# Patient Record
Sex: FEMALE | Race: WHITE | Employment: FULL TIME
[De-identification: names, ages, dates, MRNs, and addresses within clinical notes are randomized per-mention and may not be internally consistent; named-entity substitution may affect disease eponyms.]

---

## 2017-03-15 ENCOUNTER — RECORDS - HEALTHEAST (OUTPATIENT)
Dept: ADMINISTRATIVE | Facility: OTHER | Age: 37
End: 2017-03-15

## 2017-08-02 ENCOUNTER — RECORDS - HEALTHEAST (OUTPATIENT)
Dept: ADMINISTRATIVE | Facility: OTHER | Age: 37
End: 2017-08-02

## 2018-04-20 ENCOUNTER — RECORDS - HEALTHEAST (OUTPATIENT)
Dept: ADMINISTRATIVE | Facility: OTHER | Age: 38
End: 2018-04-20

## 2019-03-10 ENCOUNTER — COMMUNICATION - HEALTHEAST (OUTPATIENT)
Dept: SCHEDULING | Facility: CLINIC | Age: 39
End: 2019-03-10

## 2019-03-28 ENCOUNTER — COMMUNICATION - HEALTHEAST (OUTPATIENT)
Dept: TELEHEALTH | Facility: CLINIC | Age: 39
End: 2019-03-28

## 2019-09-16 ENCOUNTER — COMMUNICATION - HEALTHEAST (OUTPATIENT)
Dept: TELEHEALTH | Facility: CLINIC | Age: 39
End: 2019-09-16

## 2020-02-03 ENCOUNTER — OFFICE VISIT - HEALTHEAST (OUTPATIENT)
Dept: FAMILY MEDICINE | Facility: CLINIC | Age: 40
End: 2020-02-03

## 2020-02-03 ENCOUNTER — COMMUNICATION - HEALTHEAST (OUTPATIENT)
Dept: FAMILY MEDICINE | Facility: CLINIC | Age: 40
End: 2020-02-03

## 2020-02-03 DIAGNOSIS — R87.810 CERVICAL HIGH RISK HPV (HUMAN PAPILLOMAVIRUS) TEST POSITIVE: ICD-10-CM

## 2020-02-03 DIAGNOSIS — Z13.228 SCREENING FOR METABOLIC DISORDER: ICD-10-CM

## 2020-02-03 DIAGNOSIS — Z00.00 ROUTINE GENERAL MEDICAL EXAMINATION AT A HEALTH CARE FACILITY: ICD-10-CM

## 2020-02-03 DIAGNOSIS — F41.9 ANXIETY: ICD-10-CM

## 2020-02-03 LAB
CHOLEST SERPL-MCNC: 201 MG/DL
FASTING STATUS PATIENT QL REPORTED: YES
HBA1C MFR BLD: 5 % (ref 3.5–6)
HDLC SERPL-MCNC: 61 MG/DL
LDLC SERPL CALC-MCNC: 125 MG/DL
TRIGL SERPL-MCNC: 77 MG/DL
TSH SERPL DL<=0.005 MIU/L-ACNC: 1.02 UIU/ML (ref 0.3–5)

## 2020-02-03 ASSESSMENT — ANXIETY QUESTIONNAIRES
GAD7 TOTAL SCORE: 0
6. BECOMING EASILY ANNOYED OR IRRITABLE: NOT AT ALL
7. FEELING AFRAID AS IF SOMETHING AWFUL MIGHT HAPPEN: NOT AT ALL
IF YOU CHECKED OFF ANY PROBLEMS ON THIS QUESTIONNAIRE, HOW DIFFICULT HAVE THESE PROBLEMS MADE IT FOR YOU TO DO YOUR WORK, TAKE CARE OF THINGS AT HOME, OR GET ALONG WITH OTHER PEOPLE: NOT DIFFICULT AT ALL
3. WORRYING TOO MUCH ABOUT DIFFERENT THINGS: NOT AT ALL
1. FEELING NERVOUS, ANXIOUS, OR ON EDGE: NOT AT ALL
2. NOT BEING ABLE TO STOP OR CONTROL WORRYING: NOT AT ALL
4. TROUBLE RELAXING: NOT AT ALL
5. BEING SO RESTLESS THAT IT IS HARD TO SIT STILL: NOT AT ALL

## 2020-02-03 ASSESSMENT — PATIENT HEALTH QUESTIONNAIRE - PHQ9: SUM OF ALL RESPONSES TO PHQ QUESTIONS 1-9: 0

## 2020-02-03 ASSESSMENT — MIFFLIN-ST. JEOR: SCORE: 1241.48

## 2020-02-04 LAB
HPV SOURCE: NORMAL
HUMAN PAPILLOMA VIRUS 16 DNA: NEGATIVE
HUMAN PAPILLOMA VIRUS 18 DNA: NEGATIVE
HUMAN PAPILLOMA VIRUS FINAL DIAGNOSIS: NORMAL
HUMAN PAPILLOMA VIRUS OTHER HR: NEGATIVE
SPECIMEN DESCRIPTION: NORMAL

## 2020-02-11 LAB
BKR LAB AP ABNORMAL BLEEDING: NO
BKR LAB AP BIRTH CONTROL/HORMONES: NORMAL
BKR LAB AP CERVICAL APPEARANCE: NORMAL
BKR LAB AP GYN ADEQUACY: NORMAL
BKR LAB AP GYN INTERPRETATION: NORMAL
BKR LAB AP HPV REFLEX: NORMAL
BKR LAB AP LMP: NORMAL
BKR LAB AP PATIENT STATUS: NORMAL
BKR LAB AP PREVIOUS ABNORMAL: YES
BKR LAB AP PREVIOUS NORMAL: YES
HIGH RISK?: YES
PATH REPORT.COMMENTS IMP SPEC: NORMAL
RESULT FLAG (HE HISTORICAL CONVERSION): NORMAL

## 2020-09-03 ENCOUNTER — COMMUNICATION - HEALTHEAST (OUTPATIENT)
Dept: SCHEDULING | Facility: CLINIC | Age: 40
End: 2020-09-03

## 2020-09-03 DIAGNOSIS — Z20.828 EXPOSURE TO SARS-ASSOCIATED CORONAVIRUS: ICD-10-CM

## 2021-01-22 ENCOUNTER — AMBULATORY - HEALTHEAST (OUTPATIENT)
Dept: LAB | Facility: CLINIC | Age: 41
End: 2021-01-22

## 2021-01-22 DIAGNOSIS — E65 FAT DEPOSITS: ICD-10-CM

## 2021-01-22 LAB — HGB BLD-MCNC: 14.4 G/DL (ref 12–16)

## 2021-02-09 ENCOUNTER — COMMUNICATION - HEALTHEAST (OUTPATIENT)
Dept: FAMILY MEDICINE | Facility: CLINIC | Age: 41
End: 2021-02-09

## 2021-02-09 DIAGNOSIS — L70.0 ACNE VULGARIS: ICD-10-CM

## 2021-02-09 RX ORDER — TRETINOIN 0.5 MG/G
CREAM TOPICAL DAILY
Qty: 20 G | Refills: 4 | Status: SHIPPED | OUTPATIENT
Start: 2021-02-09

## 2021-02-12 ENCOUNTER — COMMUNICATION - HEALTHEAST (OUTPATIENT)
Dept: ADMINISTRATIVE | Facility: CLINIC | Age: 41
End: 2021-02-12

## 2021-05-27 ASSESSMENT — PATIENT HEALTH QUESTIONNAIRE - PHQ9: SUM OF ALL RESPONSES TO PHQ QUESTIONS 1-9: 0

## 2021-05-28 ASSESSMENT — ANXIETY QUESTIONNAIRES: GAD7 TOTAL SCORE: 0

## 2021-06-04 VITALS
HEIGHT: 61 IN | SYSTOLIC BLOOD PRESSURE: 108 MMHG | BODY MASS INDEX: 27.02 KG/M2 | DIASTOLIC BLOOD PRESSURE: 80 MMHG | HEART RATE: 80 BPM | WEIGHT: 143.1 LBS

## 2021-06-05 NOTE — TELEPHONE ENCOUNTER
Forms Request  Name of form/paperwork: Other:  Biometric Screening  Have you been seen for this request: Yes:  02/03/2020  Do we have the form: Yes- 02/03/2020  When is form needed by: As Able  How would you like the form returned:   Patient Notified form requests are processed in 3-5 business days: No    Okay to leave a detailed message? No

## 2021-06-05 NOTE — TELEPHONE ENCOUNTER
Biometric Screening has been printed. I will fill the screening out when patient's labs become available. Nikia Bedoya

## 2021-06-05 NOTE — PROGRESS NOTES
FEMALE PREVENTATIVE EXAM    Assessment and Plan:       Kierra was seen today for establish care and annual exam.    Routine general medical examination at a health care facility  I discussed the following with the patient:   Adult Healthy Living: Importance of regular exercise  Healthy nutrition  Getting adequate sleep  Stress management  Supplement use  Herbal medications/alternative medical therapies      Cervical high risk HPV (human papillomavirus) test positive  history of abnormal with colp in the past but last 2 within normal limits   -     Gynecologic Cytology (PAP Smear)    Screening for metabolic disorder  -     Glycosylated Hemoglobin A1c  -     Lipid Cascade    Mild Anxiety  -but no major concern with do base line thyroid hormone level       Thyroid Stimulating Hormone (TSH)    Other orders  -     Influenza,Seasonal,Quad,INJ =/>6months        Next follow up:  1 yr for annual physical    Immunization Reviewed and if needed ordered please see A/P    There are no preventive care reminders to display for this patient.    Immunization History   Administered Date(s) Administered     Hep A / Hep B 03/15/2017, 03/23/2017     Influenza,seasonal,quad inj =/> 6months 02/03/2020     Tdap 11/13/2012, 05/07/2014, 06/30/2016     Typhoid Live, Oral 03/15/2017     The following high BMI interventions were performed this visit: dietary management education, guidance, and counseling    I have had an Advance Directives discussion with the patient.    Subjective:   Chief Complaint: Kierra Hwang is an 40 y.o. female here for a preventative health visit.     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   work as a  .  Patient's last menstrual period was 01/31/2020 (exact date).     PHQ-9 Total Score: 0  "(2/3/2020 11:00 AM)     MADDI 7 Total Score: 0 (2/3/2020 12:00 PM)       Social History     Social History Narrative     Not on file      Healthy Habits  Are you taking a daily aspirin? No  Do you typically exercising at least 40 min, 3-4 times per week?  NO  Do you usually eat at least 4 servings of fruit and vegetables a day, include whole grains and fiber and avoid regularly eating high fat foods? Yes  Have you had an eye exam in the past two years? Yes  Do you see a dentist twice per year? Yes  Do you have any concerns regarding sleep? No    Safety Screen  If you own firearms, are they secured in a locked gun cabinet or with trigger locks? The patient does not own any firearms    Do you feel you are safe where you are living?: Yes (2/3/2020 10:57 AM)  Do you feel you are safe in your relationship(s)?: Yes (2/3/2020 10:57 AM)      Review of Systems:  Please see above.  The rest of the review of systems are negative for all systems.     Pap History:   Yes - updated in Problem List and Health Maintenance accordingly    Cancer Screening       Status Date      PAP SMEAR Overdue 1/6/1999           Patient Care Team:  Nette Friedman MD as PCP - General (Family Medicine)        History     Reviewed By Date/Time Sections Reviewed    Charlotte Johnson CMA 2/3/2020 12:01 PM Family    Nette Friedman MD 2/3/2020 11:41 AM Medical, Surgical    Charlotte Johnson CMA 2/3/2020 10:56 AM Tobacco, Alcohol, Drug Use, Sexual Activity            Objective:   Vital Signs:   Visit Vitals  /80 (Patient Site: Left Arm, Patient Position: Sitting, Cuff Size: Adult Regular)   Pulse 80   Ht 5' 1\" (1.549 m)   Wt 143 lb 1.6 oz (64.9 kg)   LMP 01/31/2020 (Exact Date)   Breastfeeding No   BMI 27.04 kg/m         PHYSICAL EXAM  Physical Exam:  General Appearance:  Appears comfortable, Alert, cooperative, no distress,   Head: Normocephalic, without obvious abnormality, atraumatic  Eyes: PERRL, conjunctiva/corneas clear, EOM's intact, both eyes  "            Nose: Nares normal, no drainage   Throat: Lips, mucosa, and tongue normal; teeth and gums normal  Neck: Supple, symmetrical, trachea midline, no adenopathy;                      Lungs: Clear to auscultation bilaterally, respirations unlabored  Heart: Regular rate and rhythm, S1 and S2 normal, no murmur, rubs or gallop  Abdomen: Soft, non-tender, bowel sounds active all four quadrants,   no masses, no organomegaly  Pelvic exam: normal external genitalia, vulva, vagina, cervix, uterus and adnexa, PAP: Pap smear done today.  Extremities: Extremities normal, atraumatic, no cyanosis or edema  Pulses: DP pulses are 1-2+ bilat.    Skin: no rashes or lesions  Neurologic: normal and equal strength bilat in upper and lower extremities          The ASCVD Risk score (Ceiba DAGOBERTO Jr., et al., 2013) failed to calculate for the following reasons:    Cannot find a previous HDL lab    Cannot find a previous total cholesterol lab         Medication List      as of February 3, 2020 12:41 PM     You have not been prescribed any medications.         Additional Screenings Completed Today:

## 2021-06-06 NOTE — PROGRESS NOTES
Dear Kierra,    Your recent Pap smear result came back within normal limits including negative for presence of any high risk viruses which are responsible for cervical cancer , we will plan to repeat the pap smear in next 5 yr  Please feel free to call if you have any concerns or questions..    Nette Friedman MD 2/11/2020 2:27 PM

## 2021-06-06 NOTE — TELEPHONE ENCOUNTER
Reached out to patient and per her request her Biometric Screening was faxed to her employer at: [phone], and a copy was mailed to her address on file.   Nikia Bedoya

## 2021-06-11 NOTE — TELEPHONE ENCOUNTER
"Asking for antibody test.  First asked her if she had symptoms in last 2 weeks and she said yes.  Then changed her mind to say a month ago.  Test ordered.    Patient is calling requesting COVID serologic antibody testing.  NOTE: Serologic testing is a blood test for 'antibodies' which are made at 10-14 days after you have had symptoms of COVID or were exposed and had an asymptomatic infection.  This does NOT test you for 'active' infection or tell you if you are contagious.    Are you a healthcare worker?  No  Do you currently have a cough, fever, body aches, shortness of breath, or difficulty breathing?  No  Did you previously have cough, fever, body aches, shortness of breath, or difficulty breathing that have now resolved? Has had previous covid symptoms.   Symptoms began 60 days ago.  Symptoms started > 14 days ago. Lab order placed per SARS-CoV-2 Serology test Standing Order using indication \"Previously symptomatic >14d since onset, currently asymptomatic\" and diagnosis code \"Screening for viral disease\" (Z11.59)          The patient was informed: \"Testing is limited each day and it may take time for testing to be available to everyone who has called. You will receive a call within 48-72 hours to schedule the serology testing. Please confirm the best number to reach you is [phone]. If you have any questions about scheduling, call [phone].\"     "

## 2021-06-15 NOTE — TELEPHONE ENCOUNTER
PCP ok to fill this?     Patient recommended to schedule a fasting physical. Overall, clean chart.

## 2021-06-15 NOTE — TELEPHONE ENCOUNTER
Incoming fax for Tretinoin 0.05% cream for prior authorization.     Key: [id number]  Last name: Jaiden  : 1980    Please initiate PA process.    Elizabeth Perry

## 2021-06-15 NOTE — TELEPHONE ENCOUNTER
Central PA team  [phone]  Pool: HE PA MED ([id number])          PA has been initiated.       PA form completed and faxed insurance via Cover My Meds     Key:  [id number] - PA Case ID: [id number] - Rx #: [id number]     Medication:  Tretinoin 0.05% cream    Insurance:  [employer]        Response will be received via fax and may take up to 5-10 business days depending on plan

## 2021-06-17 NOTE — TELEPHONE ENCOUNTER
Telephone Encounter by Nathalie Juarez at 2/16/2021 11:11 AM     Author: Nathalie Juarez Service: -- Author Type: --    Filed: 2/16/2021 11:11 AM Encounter Date: 2/12/2021 Status: Signed    : Nathalie Juarez APPROVED:    Approval start date: 0216/21  Approval end date:  02/16/24    Pharmacy has been notified of approval and will contact patient when medication is ready for pickup.

## 2021-06-18 NOTE — PATIENT INSTRUCTIONS - HE
Patient Instructions by Nette Friedman MD at 2/3/2020 11:20 AM     Author: Nette Friedman MD Service: -- Author Type: Physician    Filed: 2/3/2020 12:45 PM Encounter Date: 2/3/2020 Status: Signed    : Nette Friedman MD (Physician)         Patient Education     Prevention Guidelines, Women Ages 40 to 49  Screening tests and vaccines are an important part of managing your health. A screening test is done to find diseases in people who don't have any symptoms. The goal is to find a disease early so lifestyle changes and checkups can reduce the risk of disease. Or the goal may be to detect it early to treat it most effectively. Screening tests are not used to diagnose a disease. But they are used to see if more testing is needed. Health counseling is important, too. Below are guidelines for these, for women ages 40 to 49. Talk with your healthcare provider to make sure youre up-to-date on what you need.  Screening Who needs it How often   Type 2 diabetes or prediabetes All women beginning at age 45 and women without symptoms at any age who are overweight or obese and have 1 or more additional risk factors for diabetes At least every 3 years1   Type 2 diabetes or prediabetes All women diagnosed with gestational diabetes Lifelong testing every 3 years   Type 2 diabetes All women with prediabetes Every year   Alcohol misuse All women in this age group At routine exams   Blood pressure All women in this age group Yearly checkup if your blood pressure is normal  Normal blood pressure is less than 120/80 mm Hg  If your blood pressure reading is higher than normal, follow the advice of your healthcare provider   Breast cancer All women at average risk in this age group Screening with a mammogram can start at age 40.2 Talk with your healthcare provider to help you decide when to start screening. At age 45 start yearly mammograms.3    Cervical cancer All women in this age group, except women who have had a complete  Pre injection questionnaire was reviewed.  1. Have you had increased asthma symptoms (chest tightness, increased cough, wheezing or felt short of breath) in the past week? No  2. Have you had a marked increase in allergy symptoms(itchy eyes or nose, sneezing, runny nose, post nasal drip or throat clearing) in the past week?  No  3. Do you have a cold or respiratory tract infection, or flu-like symptoms? No  4. Did you have any problems such as increased allergy or asthma symptoms, hives or generalized itching within 12 hours of receiving your allergy injection or swelling that persisted into the next day? No  5.Are you on any new medications? Any new eye drops? Any new blood pressure or migraine medications? No  If yes,please specify____________________________________   6. Are you taking your allergy medicine as prescribed? Yes     hysterectomy Pap test every 3 years or Pap test plus human papilloma virus (HPV) test every 5 years   Colorectal cancer Women age 45 years and older at average risk  Multiple tests are available and are used at different times. Possible tests include:    Flexible sigmoidoscopy every 5 years, or    Colonoscopy every 10 years, or    CT colonography (virtual colonoscopy) every 5 years, or    Yearly fecal occult blood test, or    Yearly fecal immunochemical test every year, or    Stool DNA test, every 3 years  If you choose a test other than a colonoscopy and have an abnormal test result, you will need to follow-up with a colonoscopy. Screening advice varies among expert groups. Talk with your healthcare provider about which tests are best for you.  Some people should be screened using a different schedule because of their personal or family health history. Talk with your healthcare provider about your health history.   Chlamydia Women at increased risk for infection At routine exams if you're at risk or have symptoms   Depression All women in this age group At routine exams   Gonorrhea Sexually active women at increased risk for infection At routine exams   Hepatitis C Anyone at increased risk; 1 time for those born between 1945 and 1965 At routine exams   High cholesterol or triglycerides All women ages 45 and older who are at risk for coronary artery disease; younger women, talk with your healthcare provider At least every 5 years   HIV All women At routine exams. Those with risk factors for HIV should be tested at least annually.   Obesity All women in this age group At routine exams   Syphilis Women at increased risk for infection-talk with your healthcare provider At routine exams   Tuberculosis Women at increased risk for infection-talk with your healthcare provider Ask your healthcare provider   Vision All women in this age group Complete exam at age 40 and eye exams every 2 to 4 years. If you have a chronic  disease, ask your healthcare provider how often you should have your eyes examined.4   Vaccine Who needs it How often   Chickenpox (varicella) All women in this age group who have no record of this infection or vaccine 2 doses; the second dose should be given at least 4 weeks after the first dose   Hepatitis A Women at increased risk for infection-talk with your healthcare provider 2 doses given 6 months apart   Hepatitis B Women at increased risk for infection-talk with your healthcare provider 3 doses over 6 months; second dose should be given 1 month after the first dose; the third dose should be given at least 2 months after the second dose and at least 4 months after the first dose   Haemophilus influenzae Type B (HIB) Women at increased risk 1 to 3 doses   Influenza (flu) All women in this age group Once a year   Measles, mumps, rubella (MMR) All women in this age group who have no record of these infections or vaccines 1 or 2 doses   Meningococcal Women at increased risk for infection-talk with your healthcare provider 1 or more doses   Pneumococcal conjugate vaccine (PCV13) and pneumococcal polysaccharide vaccine (PPSV23) Women at increased risk for infection-talk with your healthcare provider 1 or 2 doses   Tetanus/diphtheria/pertussis (Td/Tdap) booster All women in this age group A 1-time dose of Tdap instead of a Td booster after age 18, then Td every 10 years   Counseling Who needs it How often   BRCA gene mutation testing for breast and ovarian cancer susceptibility Women with increased risk for having gene mutation When your risk is known   Breast cancer and chemoprevention Women at high risk for breast cancer When your risk is known   Diet and exercise Women who are overweight or obese When diagnosed, and then at routine exams   Domestic violence Women at the age in which they are able to have children At routine exams   Sexually transmitted infection prevention Women at increased risk for  infection-talk with your healthcare provider At routine exams   Use of tobacco and the health effects it can cause All women in this age group Every exam   1 American Diabetes Association  2 American College of Obstetricians and Gynecologists   3 American Cancer Society  4 American Academy of Ophthalmology  Date Last Reviewed: 11/1/2017 2000-2019 [employer]. [address] [place] [address]. All rights reserved. This information is not intended as a substitute for professional medical care. Always follow your healthcare professional's instructions.

## 2021-06-24 NOTE — TELEPHONE ENCOUNTER
RN Assessment/Reason for Call:   Okay to leave Detailed Message  Patient calling in, saw  last week, lost half her bottle of antibiotics.  Has the steroid.   Went UC.not [facility].    RN Action/Disposition:  Offered [facility]  Call back if worse symptoms    Agrees to plan.     Myra Menon, RN    [facility] Triage/med refill  3/10/2019  3:39 PM

## 2021-08-15 ENCOUNTER — HEALTH MAINTENANCE LETTER (OUTPATIENT)
Age: 41
End: 2021-08-15

## 2021-08-17 ENCOUNTER — HOSPITAL ENCOUNTER (OUTPATIENT)
Dept: HOSPITAL 75 - RAD | Age: 41
End: 2021-08-17
Attending: OBSTETRICS & GYNECOLOGY
Payer: COMMERCIAL

## 2021-08-17 DIAGNOSIS — Z12.31: Primary | ICD-10-CM

## 2021-08-17 PROCEDURE — 77063 BREAST TOMOSYNTHESIS BI: CPT

## 2021-08-17 PROCEDURE — 77067 SCR MAMMO BI INCL CAD: CPT

## 2021-08-17 NOTE — DIAGNOSTIC IMAGING REPORT
INDICATION: 

Routine screening.



COMPARISON: 

No prior mammograms are available for comparison. This is a

baseline study.



TECHNIQUE: 

2D and 3D bilateral screening mammography was performed with CAD.



FINDINGS:

Both breasts are heterogeneously dense, limiting the sensitivity

of mammography. There is a circumscribed density in the superior

right breast, best seen on the MLO view. This appears to be

laterally located. This could represent a small cluster of cysts

but additional views are recommended for further evaluation. It

is not well-seen on the CC view. There is an area of questionable

architectural distortion in the inferior and outer left breast

and additional views of this area are recommended as well. No

malignant-appearing microcalcifications are seen. The axillae are

unremarkable.



IMPRESSION: 

Bilateral breast densities. Further evaluation with additional

views would be recommended.



ACR BI-RADS Category 0: Incomplete. (Needs additional imaging

evaluation).

Result letter will be mailed to the patient.

Note: At least 10% of breast cancer is not imaged by mammography.



Dictated by: 



  Dictated on workstation # [id number]

## 2021-10-11 ENCOUNTER — HEALTH MAINTENANCE LETTER (OUTPATIENT)
Age: 41
End: 2021-10-11

## 2021-12-17 ENCOUNTER — HOSPITAL ENCOUNTER (OUTPATIENT)
Dept: HOSPITAL 75 - RAD | Age: 41
End: 2021-12-17
Attending: OBSTETRICS & GYNECOLOGY
Payer: COMMERCIAL

## 2021-12-17 DIAGNOSIS — R92.2: ICD-10-CM

## 2021-12-17 DIAGNOSIS — N63.11: Primary | ICD-10-CM

## 2021-12-17 PROCEDURE — 76642 ULTRASOUND BREAST LIMITED: CPT

## 2021-12-17 PROCEDURE — 77066 DX MAMMO INCL CAD BI: CPT

## 2021-12-17 PROCEDURE — 77062 BREAST TOMOSYNTHESIS BI: CPT

## 2021-12-17 NOTE — DIAGNOSTIC IMAGING REPORT
INDICATION: Bilateral breast densities.



Correlation is made with screening mammogram from 08/17/2021 and

diagnostic mammogram from 12/17/2021.



Right breast:



Sonographic interrogation of the upper outer right breast does

show a circumscribed hypoechoic nodule at the 11 o'clock

location, 4 cm from the nipple measuring 6 mm x 7 mm x 4 mm. This

may account for the mammographic density. No internal vascularity

is seen. No posterior acoustic shadowing is identified. No other

right breast abnormalities are seen.



Left breast:



Sonographic interrogation from the 3-6 o'clock location left

breast was performed. No sonographic abnormality is seen. No

solid or cystic masses detected.



IMPRESSION: 

1. Unremarkable left breast. The area of linear density in the

left breast may represent some scarring from patient's previous

breast reduction surgery.

2. Hypoechoic nodule 11 o'clock location right breast, 4 cm from

the nipple, likely accounting for the mammographic density. This

does have benign features. This could represent a small lymph

node or fibroadenoma. Even so, sonographic followup of the right

breast in 6 months is recommended to show continued stability.



BI-RADS Category 3



ACR BI-RADS Category 3: Probably benign findings.

Result letter will be mailed to the patient.

Note: At least 10% of breast cancer is not imaged by mammography.



Dictated by: 



  Dictated on workstation # [id number]

## 2021-12-17 NOTE — DIAGNOSTIC IMAGING REPORT
Indication: Bilateral breast densities. Patient presents for

additional views.



Correlation is made with the screening study from 08/17/2021.



Bilateral 2-D and 3-D diagnostic mammography was performed.



Right breast:



There is a persistent nodular density in the upper and outer

right breast approximately 8 cm from the nipple after additional

views including spot compression ML, conventional ML as well as

spot compression exaggerated CC and routine exaggerated CC views.

This area should be evaluated with ultrasound.



Left breast shows some persistent density in the lower outer

portion approximately 7 cm from the nipple. This could represent

scarring. Patient did undergo breast reduction surgery in

February 2021. No discrete mass is seen. No suspicious

calcifications are identified.



IMPRESSION: BI-RADS 0



1. Persistent nodular density in the upper outer right breast.

Further evaluation with ultrasound is recommended.

2. Residual density lower outer left breast. This could represent

area of scarring from a prior breast reduction surgery. This area

will be further evaluated with ultrasound as well today.



ACR BI-RADS Category 0: Incomplete. (Needs additional imaging

evaluation).

Result letter will be mailed to the patient.

Note: At least 10% of breast cancer is not imaged by mammography.



Dictated by: 



  Dictated on workstation # [id number]

## 2022-09-16 ENCOUNTER — HOSPITAL ENCOUNTER (EMERGENCY)
Dept: HOSPITAL 75 - ER | Age: 42
LOS: 1 days | Discharge: HOME | End: 2022-09-17
Payer: COMMERCIAL

## 2022-09-16 VITALS — BODY MASS INDEX: 24.46 KG/M2 | HEIGHT: 60.63 IN | WEIGHT: 127.87 LBS

## 2022-09-16 DIAGNOSIS — R51.9: Primary | ICD-10-CM

## 2022-09-16 DIAGNOSIS — Z28.310: ICD-10-CM

## 2022-09-16 DIAGNOSIS — R11.2: ICD-10-CM

## 2022-09-16 DIAGNOSIS — Z20.822: ICD-10-CM

## 2022-09-16 DIAGNOSIS — F17.210: ICD-10-CM

## 2022-09-16 PROCEDURE — 87636 SARSCOV2 & INF A&B AMP PRB: CPT

## 2022-09-16 PROCEDURE — 99283 EMERGENCY DEPT VISIT LOW MDM: CPT

## 2022-09-17 VITALS — DIASTOLIC BLOOD PRESSURE: 98 MMHG | SYSTOLIC BLOOD PRESSURE: 134 MMHG

## 2022-09-17 NOTE — ED HEADACHE
General


Chief Complaint:  Head/Cervical Problems


Stated Complaint:  BOTOX ON FACE YESTERDAY,HEADACHE,N/V,ABD PAIN


Nursing Triage Note:  


patient stats she has had a headache since yesterday. received botox yesterday, 


headache worse. Vomitting today.


Source:  patient





History of Present Illness


Date Seen by Provider:  Sep 16, 2022


Time Seen by Provider:  23:55


Initial Comments


PT ARRIVES VIA POV FROM HOME


STATES SHE GOT COSMETIC BOTOX INJECTIONS IN HER FACE YESTERDAY--HAS HAD THEM 

BEFORE AND NOT HAD PROBLEMS. 


HAS HAD A HEADACHE SINCE YESTERDAY, BEGAN A COUPLE OF HOURS AFTER BOTOX 

INJECTIONS


TODAY SHE BEGAN TO HAVE NAUSEA AND VOMITING--VOMITED X 1


NO DIARRHEA


HAS HAD SOME MILD LOW ABDOMINAL CRAMPING FOR THE LAST 3 DAYS--HER PERIOD IS DUE 

THIS WEEK. PT HAS IUD IN PLACE. 


NO FEVER


NO COUGH OR URI SYMPTOMS





PT'S  IS SICK AND KIDS ARE IN SCHOOL WITH KNOWN SICK CONTACTS





TOOK ADVIL YESTERDAY AFTERNOON, AND TODAY AT NOON--MINIMAL RELIEF. 





DOES NOT TAKE ANY MEDICATIONS NORMALLY





PCP: DR. BULLOCK





Allergies and Home Medications


Allergies


Coded Allergies:  


     No Known Drug Allergies (Unverified , 9/17/22)





Patient Home Medication List


Ketorolac Tromethamine (Ketorolac Tromethamine) 10 Mg Tablet, 10 MG PO Q6H


   Prescribed by: SAMIR ESPINOSA on 9/17/22 0124


Ondansetron (Ondansetron Odt) 4 Mg Tab.rapdis, 4 MG PO Q4H


   Prescribed by: SAMIR ESPINOSA on 9/17/22 0124





Review of Systems


Review of Systems


Constitutional:  no symptoms reported


Eyes:  No Symptoms Reported


Ears, Nose, Mouth, Throat:  no symptoms reported


Respiratory:  no symptoms reported


Cardiovascular:  no symptoms reported


Gastrointestinal:  see HPI


Genitourinary:  no symptoms reported


Musculoskeletal:  no symptoms reported


Skin:  no symptoms reported


Psychiatric/Neurological:  See HPI, Headache





Past Medical-Social-Family Hx


Patient Social History


Tobacco Use?:  Yes


Tobacco type used:  Cigarettes


Smoking Status:  Former Smoker


Substance use?:  No





Immunizations Up To Date


First/Initial COVID19 Vaccinat:  Moderna


Second COVID19 Vaccination Jose:  Moderna





Past Medical History


Respiratory:  No


Cardiac:  No


Neurological:  No


Pregnant:  No


Reproductive Disorders:  No


GYN History:  IUD


Genitourinary:  No


Gastrointestinal:  No


Musculoskeletal:  No


Endocrine:  No


HEENT:  No


Cancer:  No


Psychosocial:  No


Integumentary:  No


Blood Disorders:  No





Physical Exam


Vital Signs





Vital Signs - First Documented








 9/16/22





 23:52


 


Temp 36.6


 


Pulse 104


 


Resp 20


 


B/P (MAP) 134/98 (110)


 


Pulse Ox 98


 


O2 Delivery Room Air





Capillary Refill : Less Than 3 Seconds


Height, Weight, BMI


Height: '"


Weight: lbs. oz. kg; 24.00 BMI


Method:


General Appearance:  WD/WN, no apparent distress


HEENT:  normal ENT inspection


Neck:  non-tender, full range of motion, supple, normal inspection


Cardiovascular:  regular rate, rhythm, no murmur


Respiratory:  normal breath sounds, no respiratory distress, no accessory muscle

use


Gastrointestinal:  non tender, soft


Back:  normal inspection


Extremities:  normal inspection


Psychiatric:  alert, oriented x 3


Crainal Nerves:  normal hearing, normal speech, PERRL


Coordination/Gait:  normal gait


Motor/Sensory:  no motor deficit, no sensory deficit


Skin:  normal color, warm/dry





Progress/Results/Core Measures


Results/Orders


Lab Results





Laboratory Tests








Test


 9/16/22


23:50 Range/Units


 


 


Influenza Type A (RT-PCR) Not Detected  Not Detecte  


 


Influenza Type B (RT-PCR) Not Detected  Not Detecte  


 


SARS-CoV-2 RNA (RT-PCR) Not Detected  Not Detecte  








My Orders





Orders - SAMIR ESPINOSA DO


[facility] 19 Inhouse Test (9/16/22 23:58)


Influenza A And B By Pcr (9/16/22 23:58)


Isolation Central Supply Req (9/16/22 23:58)


Ondansetron  Oral Dissolve Tab (Zofran (9/17/22 00:07)


Ketorolac Injection (Toradol Injection) (9/17/22 00:07)





Vital Signs/I&O











 9/16/22





 23:52


 


Temp 36.6


 


Pulse 104


 


Resp 20


 


B/P (MAP) 134/98 (110)


 


Pulse Ox 98


 


O2 Delivery Room Air














Blood Pressure Mean:                    110











Progress


Progress Note :  


Progress Note


PLACED IN ISOLATION ROOM


PPE WORN


COVID AND FLU TESTING DONE





GIVEN TORADOL AND ZOFRAN WITH RESOLUTION OF  SYMPTOMS


NO LONGER SENSITIVE TO LIGHT


PT AND MALE ARE LAYING ON THE ER CART TOGETHER DURING REMAINDER OF ER STAY





Departure


Impression





   Primary Impression:  


   Headache


   Additional Impression:  


   Nausea & vomiting


Disposition:  01 HOME, SELF-CARE


Condition:  Improved





Departure-Patient Inst.


Decision time for Depature:  00:43


Referrals:  


BRANDON BULLOCK DO (PCP/Family)


Primary Care Physician


Patient Instructions:  Headache, Adult (DC), Nausea and Vomiting, Adult





Add. Discharge Instructions:  


LOTS OF CLEAR LIQUIDS--WATER, BROTH, JELLO, GATORADE





IF YOU ARE STILL HAVING SYMPTOMS YOU NEED TO BE RECHECKED IN 1-2 DAYS





All discharge instructions reviewed with patient and/or family. Voiced 

understanding.


Scripts


Ketorolac Tromethamine (Ketorolac Tromethamine) 10 Mg Tablet


10 MG PO Q6H for Pain, #15 TAB


   Prov: SAMIR ESPINOSA DO         9/17/22 


Ondansetron (Ondansetron Odt) 4 Mg Tab.rapdis


4 MG PO Q4H for Nausea/Vomiting, #10 TAB


   Prov: SAMIR ESPINOSA DO         9/17/22











SAMIR ESPINOSA DO                 Sep 17, 2022 00:08

## 2022-09-24 ENCOUNTER — HEALTH MAINTENANCE LETTER (OUTPATIENT)
Age: 42
End: 2022-09-24

## 2023-01-29 ENCOUNTER — HEALTH MAINTENANCE LETTER (OUTPATIENT)
Age: 43
End: 2023-01-29

## 2023-10-14 ENCOUNTER — HEALTH MAINTENANCE LETTER (OUTPATIENT)
Age: 43
End: 2023-10-14